# Patient Record
(demographics unavailable — no encounter records)

---

## 2024-10-15 NOTE — PHYSICAL EXAM
[de-identified] : R hand and wrist hand and wrist swelling tenderness at the surgical site finger stiffness Decreased sensation in median nerve distribution Wrist stiffness 10/10 flex/ext Limited pro/supination  Limited strength with pinch and

## 2024-10-15 NOTE — HISTORY OF PRESENT ILLNESS
[Work related] : work related [4] : 4 [3] : 3 [Dull/Aching] : dull/aching [Not working due to injury] : Work status: not working due to injury [] : yes [de-identified] : WC not autth therapy  She is getting worse  I independently reviewed the MRI and my interpretation is TFCC partial tear; SL partial tear [FreeTextEntry1] : R wrist [de-identified] : HEP ,brace

## 2024-10-15 NOTE — ASSESSMENT
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  Without any more treatment she has reached MMI.

## 2024-11-12 NOTE — ASSESSMENT
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 She will likely need  CTR in the future  Without any more treatment she has reached MMI.

## 2024-11-12 NOTE — HISTORY OF PRESENT ILLNESS
[Work related] : work related [4] : 4 [3] : 3 [Dull/Aching] : dull/aching [Not working due to injury] : Work status: not working due to injury [] : yes [de-identified] : WC 2/24/20 She still has R wrist pain R hand numbness  WC has not approved therapy  [FreeTextEntry1] : R wrist [de-identified] : HEP ,brace

## 2024-11-12 NOTE — PHYSICAL EXAM
[de-identified] : R hand and wrist hand and wrist swelling tenderness at the surgical site finger stiffness Decreased sensation in median nerve distribution Wrist stiffness 10/10 flex/ext Limited pro/supination  Limited strength with pinch and  +tinels, compression and phalens at the Carpal tunnel Weakness with

## 2024-12-17 NOTE — HISTORY OF PRESENT ILLNESS
[Work related] : work related [3] : 3 [Dull/Aching] : dull/aching [Not working due to injury] : Work status: not working due to injury [] : yes [6] : 6 [de-identified] : WC 2/24/20 She is about the same as last visit She has not been approved for treatment  She has ZACHARY coming up  [FreeTextEntry1] : R wrist [de-identified] : HEP ,brace

## 2024-12-17 NOTE — PHYSICAL EXAM
[de-identified] : R hand and wrist hand and wrist swelling tenderness at the surgical site finger stiffness Decreased sensation in median nerve distribution Wrist stiffness 10/10 flex/ext Limited pro/supination  Limited strength with pinch and  +tinels, compression and phalens at the Carpal tunnel Weakness with

## 2025-01-14 NOTE — ASSESSMENT
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 She will likely need CTR in the future  Without any more treatment she has reached MMI.  REturn in 4 weeks  No work- 100% total disability

## 2025-01-14 NOTE — REASON FOR VISIT
[FreeTextEntry2] : 1/14/2025: PT is here to F/U. She has extensor tenosynovitis of the right wrist and carpel Tunell of the right wrist that hasn't gotten better since last visit. Has not gone to therapy as WC not authorizing

## 2025-01-14 NOTE — HISTORY OF PRESENT ILLNESS
[Work related] : work related [Not working due to injury] : Work status: not working due to injury [] : yes [de-identified] : WC 2/24/20 She had most recent surgery 2 years ago WC denying appropriate therapy  [FreeTextEntry3] : 2/24/20

## 2025-01-14 NOTE — PHYSICAL EXAM
[de-identified] : R hand and wrist hand and wrist swelling tenderness at the surgical site finger stiffness Decreased sensation in median nerve distribution Wrist stiffness 10/10 flex/ext Limited pro/supination  Limited strength with pinch and  +tinels, compression and phalens at the Carpal tunnel Weakness with

## 2025-03-04 NOTE — HISTORY OF PRESENT ILLNESS
[Work related] : work related [5] : 5 [Dull/Aching] : dull/aching [Not working due to injury] : Work status: not working due to injury [] : yes [de-identified] : WC 2/24/20 She continues to have R hand and wrist pain WC has not been auth therapy for a while  She has reached MMI [FreeTextEntry3] : 2/24/2020

## 2025-03-04 NOTE — PHYSICAL EXAM
[de-identified] : R hand and wrist hand and wrist swelling tenderness at the surgical site finger stiffness Decreased sensation in median nerve distribution Wrist stiffness 10/10 flex/ext measured 3x passively- all 3 times are the same  Limited pro/supination  Limited strength with pinch and  +tinels, compression and phalens at the Carpal tunnel Weakness with

## 2025-03-04 NOTE — REASON FOR VISIT
[FreeTextEntry2] : 03/04/2025: CARLENE BRAY is here to F/U with WC injury Carpal tunnel syndrome of right wrist, Complex tear of triangular fibrocartilage of right wrist, and Extensor tenosynovitis of right wrist.

## 2025-03-04 NOTE — ASSESSMENT
[FreeTextEntry1] : She has 70% loss of the R hand  This is based on her loss of ROM (50%) and CTS (20%) Her wrist would be a lot better if WC would auth therapy appropriately Return prn